# Patient Record
Sex: MALE | Race: WHITE | NOT HISPANIC OR LATINO | Employment: FULL TIME | URBAN - NONMETROPOLITAN AREA
[De-identification: names, ages, dates, MRNs, and addresses within clinical notes are randomized per-mention and may not be internally consistent; named-entity substitution may affect disease eponyms.]

---

## 2019-03-01 ENCOUNTER — TRANSCRIBE ORDERS (OUTPATIENT)
Dept: ADMINISTRATIVE | Facility: HOSPITAL | Age: 29
End: 2019-03-01

## 2019-03-01 DIAGNOSIS — R94.31 ABNORMAL EKG: Primary | ICD-10-CM

## 2019-03-20 ENCOUNTER — HOSPITAL ENCOUNTER (OUTPATIENT)
Dept: CARDIOLOGY | Facility: HOSPITAL | Age: 29
Discharge: HOME | End: 2019-03-20
Admitting: FAMILY MEDICINE

## 2019-03-20 DIAGNOSIS — R94.31 ABNORMAL EKG: ICD-10-CM

## 2019-03-20 PROCEDURE — 93306 TTE W/DOPPLER COMPLETE: CPT

## 2019-03-24 LAB
MAXIMAL PREDICTED HEART RATE: 193 BPM
STRESS TARGET HR: 164 BPM

## 2022-06-26 ENCOUNTER — APPOINTMENT (OUTPATIENT)
Dept: GENERAL RADIOLOGY | Facility: HOSPITAL | Age: 32
End: 2022-06-26

## 2022-06-26 ENCOUNTER — HOSPITAL ENCOUNTER (EMERGENCY)
Facility: HOSPITAL | Age: 32
Discharge: HOME | End: 2022-06-26
Attending: EMERGENCY MEDICINE | Admitting: EMERGENCY MEDICINE

## 2022-06-26 VITALS
OXYGEN SATURATION: 97 % | DIASTOLIC BLOOD PRESSURE: 97 MMHG | RESPIRATION RATE: 20 BRPM | HEIGHT: 73 IN | SYSTOLIC BLOOD PRESSURE: 165 MMHG | WEIGHT: 256 LBS | TEMPERATURE: 99.4 F | HEART RATE: 97 BPM | BODY MASS INDEX: 33.93 KG/M2

## 2022-06-26 DIAGNOSIS — R50.9 FEVER AND CHILLS: Primary | ICD-10-CM

## 2022-06-26 DIAGNOSIS — B34.9 VIRAL ILLNESS: ICD-10-CM

## 2022-06-26 LAB
FLUAV RNA RESP QL NAA+PROBE: NOT DETECTED
FLUBV RNA RESP QL NAA+PROBE: NOT DETECTED
S PYO AG THROAT QL: NEGATIVE
SARS-COV-2 RNA RESP QL NAA+PROBE: NOT DETECTED

## 2022-06-26 PROCEDURE — 87880 STREP A ASSAY W/OPTIC: CPT | Performed by: PHYSICIAN ASSISTANT

## 2022-06-26 PROCEDURE — 87636 SARSCOV2 & INF A&B AMP PRB: CPT | Performed by: PHYSICIAN ASSISTANT

## 2022-06-26 PROCEDURE — 87081 CULTURE SCREEN ONLY: CPT | Performed by: PHYSICIAN ASSISTANT

## 2022-06-26 PROCEDURE — C9803 HOPD COVID-19 SPEC COLLECT: HCPCS | Performed by: PHYSICIAN ASSISTANT

## 2022-06-26 PROCEDURE — 71046 X-RAY EXAM CHEST 2 VIEWS: CPT

## 2022-06-26 PROCEDURE — 99283 EMERGENCY DEPT VISIT LOW MDM: CPT

## 2022-06-26 RX ORDER — IBUPROFEN 800 MG/1
800 TABLET ORAL EVERY 8 HOURS PRN
Qty: 60 TABLET | Refills: 0 | Status: SHIPPED | OUTPATIENT
Start: 2022-06-26

## 2022-06-26 RX ORDER — IBUPROFEN 800 MG/1
800 TABLET ORAL ONCE
Status: COMPLETED | OUTPATIENT
Start: 2022-06-26 | End: 2022-06-26

## 2022-06-26 RX ORDER — ACETAMINOPHEN 500 MG
500 TABLET ORAL EVERY 6 HOURS PRN
Qty: 60 TABLET | Refills: 0 | Status: SHIPPED | OUTPATIENT
Start: 2022-06-26

## 2022-06-26 RX ADMIN — IBUPROFEN 800 MG: 800 TABLET ORAL at 11:12

## 2022-06-26 NOTE — ED PROVIDER NOTES
Subjective   31-year-old male presents to the emergency department chief complaint fever, cough, chills, body aches for the past 2 days.  Patient was seen at urgent care yesterday.  Patient states he has continued to spike fevers of 10 2-1 03.  Has had nonproductive cough.  Patient also states he has had sore throat.      History provided by:  Patient   used: No    Flu Symptoms  Presenting symptoms: cough, fatigue, fever, headache, myalgias and sore throat    Presenting symptoms: no diarrhea and no nausea    Severity:  Moderate  Onset quality:  Gradual  Duration:  2 days  Progression:  Worsening  Chronicity:  New  Relieved by:  Nothing  Worsened by:  Nothing  Associated symptoms: chills and nasal congestion    Associated symptoms: no neck stiffness    Risk factors: not elderly, no diabetes problem, no heart disease, no immunocompromised state, no kidney disease, no liver disease, not pregnant and no sick contacts        Review of Systems   Constitutional: Positive for chills, fatigue and fever.   HENT: Positive for congestion and sore throat.    Eyes: Negative.  Negative for photophobia, pain, redness and itching.   Respiratory: Positive for cough.    Cardiovascular: Negative for leg swelling.   Gastrointestinal: Negative.  Negative for anal bleeding, blood in stool, constipation, diarrhea, nausea and rectal pain.   Genitourinary: Negative.  Negative for genital sores.   Musculoskeletal: Positive for myalgias. Negative for arthralgias, back pain, gait problem and neck stiffness.   Neurological: Positive for headaches.   All other systems reviewed and are negative.      History reviewed. No pertinent past medical history.    No Known Allergies    History reviewed. No pertinent surgical history.    History reviewed. No pertinent family history.    Social History     Socioeconomic History   • Marital status:    Tobacco Use   • Smoking status: Former Smoker   • Smokeless tobacco: Never Used    Substance and Sexual Activity   • Alcohol use: Yes   • Sexual activity: Defer           Objective   Physical Exam  Vitals and nursing note reviewed.   Constitutional:       General: He is not in acute distress.     Appearance: Normal appearance. He is well-developed and normal weight. He is not ill-appearing, toxic-appearing or diaphoretic.   HENT:      Head: Normocephalic and atraumatic.      Right Ear: Tympanic membrane, ear canal and external ear normal. There is no impacted cerumen.      Left Ear: Tympanic membrane, ear canal and external ear normal. There is no impacted cerumen.      Nose: Nose normal. No congestion or rhinorrhea.      Mouth/Throat:      Mouth: Mucous membranes are moist.      Pharynx: Oropharynx is clear. No oropharyngeal exudate or posterior oropharyngeal erythema.   Eyes:      General: No scleral icterus.        Right eye: No discharge.         Left eye: No discharge.      Extraocular Movements: Extraocular movements intact.      Conjunctiva/sclera: Conjunctivae normal.      Pupils: Pupils are equal, round, and reactive to light.   Neck:      Vascular: No carotid bruit.   Cardiovascular:      Rate and Rhythm: Normal rate and regular rhythm.      Pulses: Normal pulses.      Heart sounds: Normal heart sounds. No murmur heard.    No friction rub. No gallop.   Pulmonary:      Effort: Pulmonary effort is normal. No respiratory distress.      Breath sounds: Normal breath sounds. No stridor. No wheezing, rhonchi or rales.   Chest:      Chest wall: No tenderness.   Abdominal:      General: Bowel sounds are normal. There is no distension.      Palpations: Abdomen is soft. There is no mass.      Tenderness: There is no abdominal tenderness. There is no right CVA tenderness, left CVA tenderness, guarding or rebound.      Hernia: No hernia is present.   Musculoskeletal:         General: No swelling, tenderness, deformity or signs of injury. Normal range of motion.      Cervical back: Normal range of  motion and neck supple. No rigidity or tenderness.      Right lower leg: No edema.      Left lower leg: No edema.   Lymphadenopathy:      Cervical: No cervical adenopathy.   Skin:     General: Skin is warm and dry.      Capillary Refill: Capillary refill takes less than 2 seconds.      Coloration: Skin is not jaundiced or pale.      Findings: No bruising, erythema, lesion or rash.   Neurological:      General: No focal deficit present.      Mental Status: He is alert and oriented to person, place, and time.      Cranial Nerves: No cranial nerve deficit.      Sensory: No sensory deficit.      Motor: No weakness.      Coordination: Coordination normal.      Gait: Gait normal.      Deep Tendon Reflexes: Reflexes are normal and symmetric. Reflexes normal.   Psychiatric:         Mood and Affect: Mood normal.         Behavior: Behavior normal.         Thought Content: Thought content normal.         Judgment: Judgment normal.         Procedures           ED Course  ED Course as of 06/26/22 1228   Sun Jun 26, 2022   1226   IMPRESSION:  No acute cardiopulmonary process. [BH]      ED Course User Index  [] Perez Dangelo PA-C                                           [facility]    Final diagnoses:   Fever and chills   Viral illness       ED Disposition  ED Disposition     ED Disposition   Discharge    Condition   Stable    Comment   --             Frantz Santo,   [address] [address]  [phone]    Call in 1 day           Medication List      New Prescriptions    acetaminophen 500 MG tablet  Commonly known as: TYLENOL  Take 1 tablet by mouth Every 6 (Six) Hours As Needed for Mild Pain .     ibuprofen 800 MG tablet  Commonly known as: ADVIL,MOTRIN  Take 1 tablet by mouth Every 8 (Eight) Hours As Needed for Mild Pain .           Where to Get Your Medications      These medications were sent to [facility] [address] [place] [address] HUYEN MONACO [facility] - [phone]  -  [phone] FX  [address] [facility] [address]    Phone: [phone]   · acetaminophen 500 MG tablet  · ibuprofen 800 MG tablet          Perez Dangelo PA-C  06/26/22 1854

## 2022-06-27 ENCOUNTER — HOSPITAL ENCOUNTER (EMERGENCY)
Facility: HOSPITAL | Age: 32
Discharge: HOME | End: 2022-06-27
Attending: EMERGENCY MEDICINE | Admitting: EMERGENCY MEDICINE

## 2022-06-27 VITALS
BODY MASS INDEX: 34.54 KG/M2 | DIASTOLIC BLOOD PRESSURE: 98 MMHG | HEIGHT: 72 IN | HEART RATE: 70 BPM | SYSTOLIC BLOOD PRESSURE: 152 MMHG | WEIGHT: 255 LBS | TEMPERATURE: 97.7 F | RESPIRATION RATE: 18 BRPM | OXYGEN SATURATION: 100 %

## 2022-06-27 DIAGNOSIS — B34.9 VIRAL ILLNESS: Primary | ICD-10-CM

## 2022-06-27 DIAGNOSIS — E86.0 DEHYDRATION: ICD-10-CM

## 2022-06-27 LAB
ALBUMIN SERPL-MCNC: 4.3 G/DL (ref 3.5–5.2)
ALBUMIN/GLOB SERPL: 1.6 G/DL
ALP SERPL-CCNC: 80 U/L (ref 39–117)
ALT SERPL W P-5'-P-CCNC: 30 U/L (ref 1–41)
ANION GAP SERPL CALCULATED.3IONS-SCNC: 16.1 MMOL/L (ref 5–15)
AST SERPL-CCNC: 30 U/L (ref 1–40)
BASOPHILS # BLD AUTO: 0.02 10*3/MM3 (ref 0–0.2)
BASOPHILS NFR BLD AUTO: 0.3 % (ref 0–1.5)
BILIRUB SERPL-MCNC: 0.4 MG/DL (ref 0–1.2)
BUN SERPL-MCNC: 16 MG/DL (ref 6–20)
BUN/CREAT SERPL: 14.7 (ref 7–25)
CALCIUM SPEC-SCNC: 8.9 MG/DL (ref 8.6–10.5)
CHLORIDE SERPL-SCNC: 103 MMOL/L (ref 98–107)
CK SERPL-CCNC: 207 U/L (ref 20–200)
CO2 SERPL-SCNC: 18.9 MMOL/L (ref 22–29)
CREAT SERPL-MCNC: 1.09 MG/DL (ref 0.76–1.27)
DEPRECATED RDW RBC AUTO: 43.5 FL (ref 37–54)
EGFRCR SERPLBLD CKD-EPI 2021: 93.6 ML/MIN/1.73
EOSINOPHIL # BLD AUTO: 0.13 10*3/MM3 (ref 0–0.4)
EOSINOPHIL NFR BLD AUTO: 1.7 % (ref 0.3–6.2)
ERYTHROCYTE [DISTWIDTH] IN BLOOD BY AUTOMATED COUNT: 13.6 % (ref 12.3–15.4)
GLOBULIN UR ELPH-MCNC: 2.7 GM/DL
GLUCOSE SERPL-MCNC: 87 MG/DL (ref 65–99)
HCT VFR BLD AUTO: 41.8 % (ref 37.5–51)
HGB BLD-MCNC: 14.4 G/DL (ref 13–17.7)
IMM GRANULOCYTES # BLD AUTO: 0.02 10*3/MM3 (ref 0–0.05)
IMM GRANULOCYTES NFR BLD AUTO: 0.3 % (ref 0–0.5)
LIPASE SERPL-CCNC: 21 U/L (ref 13–60)
LYMPHOCYTES # BLD AUTO: 1.71 10*3/MM3 (ref 0.7–3.1)
LYMPHOCYTES NFR BLD AUTO: 22.3 % (ref 19.6–45.3)
MAGNESIUM SERPL-MCNC: 1.9 MG/DL (ref 1.6–2.6)
MCH RBC QN AUTO: 30.1 PG (ref 26.6–33)
MCHC RBC AUTO-ENTMCNC: 34.4 G/DL (ref 31.5–35.7)
MCV RBC AUTO: 87.3 FL (ref 79–97)
MONOCYTES # BLD AUTO: 0.55 10*3/MM3 (ref 0.1–0.9)
MONOCYTES NFR BLD AUTO: 7.2 % (ref 5–12)
MYOGLOBIN SERPL-MCNC: 59 NG/ML (ref 28–72)
NEUTROPHILS NFR BLD AUTO: 5.24 10*3/MM3 (ref 1.7–7)
NEUTROPHILS NFR BLD AUTO: 68.2 % (ref 42.7–76)
NRBC BLD AUTO-RTO: 0 /100 WBC (ref 0–0.2)
PLATELET # BLD AUTO: 262 10*3/MM3 (ref 140–450)
PMV BLD AUTO: 11.1 FL (ref 6–12)
POTASSIUM SERPL-SCNC: 4.6 MMOL/L (ref 3.5–5.2)
PROT SERPL-MCNC: 7 G/DL (ref 6–8.5)
RBC # BLD AUTO: 4.79 10*6/MM3 (ref 4.14–5.8)
SODIUM SERPL-SCNC: 138 MMOL/L (ref 136–145)
WBC NRBC COR # BLD: 7.67 10*3/MM3 (ref 3.4–10.8)

## 2022-06-27 PROCEDURE — 82550 ASSAY OF CK (CPK): CPT | Performed by: PHYSICIAN ASSISTANT

## 2022-06-27 PROCEDURE — 96360 HYDRATION IV INFUSION INIT: CPT

## 2022-06-27 PROCEDURE — 83690 ASSAY OF LIPASE: CPT | Performed by: PHYSICIAN ASSISTANT

## 2022-06-27 PROCEDURE — 85025 COMPLETE CBC W/AUTO DIFF WBC: CPT | Performed by: PHYSICIAN ASSISTANT

## 2022-06-27 PROCEDURE — 99283 EMERGENCY DEPT VISIT LOW MDM: CPT

## 2022-06-27 PROCEDURE — 80053 COMPREHEN METABOLIC PANEL: CPT | Performed by: PHYSICIAN ASSISTANT

## 2022-06-27 PROCEDURE — 83735 ASSAY OF MAGNESIUM: CPT | Performed by: PHYSICIAN ASSISTANT

## 2022-06-27 PROCEDURE — 83874 ASSAY OF MYOGLOBIN: CPT | Performed by: PHYSICIAN ASSISTANT

## 2022-06-27 RX ORDER — SODIUM CHLORIDE 0.9 % (FLUSH) 0.9 %
10 SYRINGE (ML) INJECTION AS NEEDED
Status: DISCONTINUED | OUTPATIENT
Start: 2022-06-27 | End: 2022-06-27 | Stop reason: HOSPADM

## 2022-06-27 RX ADMIN — SODIUM CHLORIDE 1000 ML: 9 INJECTION, SOLUTION INTRAVENOUS at 12:15

## 2022-06-27 RX ADMIN — SODIUM CHLORIDE 1000 ML: 9 INJECTION, SOLUTION INTRAVENOUS at 13:55

## 2022-06-28 LAB — BACTERIA SPEC AEROBE CULT: NORMAL

## 2023-02-02 ENCOUNTER — HOSPITAL ENCOUNTER (EMERGENCY)
Facility: HOSPITAL | Age: 33
Discharge: HOME | End: 2023-02-02
Attending: EMERGENCY MEDICINE
Payer: COMMERCIAL

## 2023-02-02 VITALS
DIASTOLIC BLOOD PRESSURE: 95 MMHG | HEIGHT: 72 IN | TEMPERATURE: 97.8 F | HEART RATE: 76 BPM | OXYGEN SATURATION: 98 % | SYSTOLIC BLOOD PRESSURE: 127 MMHG | WEIGHT: 255 LBS | BODY MASS INDEX: 34.54 KG/M2 | RESPIRATION RATE: 18 BRPM

## 2023-02-02 DIAGNOSIS — L50.9 URTICARIA: ICD-10-CM

## 2023-02-02 DIAGNOSIS — R22.0 SWELLING OF BOTH LIPS: ICD-10-CM

## 2023-02-02 DIAGNOSIS — T78.40XA ALLERGIC REACTION, INITIAL ENCOUNTER: Primary | ICD-10-CM

## 2023-02-02 PROCEDURE — 99283 EMERGENCY DEPT VISIT LOW MDM: CPT

## 2023-02-02 PROCEDURE — 6370000000 HC RX 637 (ALT 250 FOR IP): Performed by: EMERGENCY MEDICINE

## 2023-02-02 PROCEDURE — 94760 N-INVAS EAR/PLS OXIMETRY 1: CPT

## 2023-02-02 RX ORDER — FAMOTIDINE 40 MG/1
40 TABLET, FILM COATED ORAL EVERY EVENING
Qty: 7 TABLET | Refills: 0 | Status: SHIPPED | OUTPATIENT
Start: 2023-02-02 | End: 2023-02-09

## 2023-02-02 RX ORDER — BUPROPION HYDROCHLORIDE 150 MG/1
150 TABLET, EXTENDED RELEASE ORAL 2 TIMES DAILY
COMMUNITY

## 2023-02-02 RX ORDER — DIPHENHYDRAMINE HCL 25 MG
50 CAPSULE ORAL ONCE
Status: COMPLETED | OUTPATIENT
Start: 2023-02-02 | End: 2023-02-02

## 2023-02-02 RX ORDER — PREDNISONE 20 MG/1
60 TABLET ORAL DAILY
Qty: 15 TABLET | Refills: 0 | Status: SHIPPED | OUTPATIENT
Start: 2023-02-02 | End: 2023-02-07

## 2023-02-02 RX ORDER — FAMOTIDINE 20 MG/1
40 TABLET, FILM COATED ORAL ONCE
Status: COMPLETED | OUTPATIENT
Start: 2023-02-02 | End: 2023-02-02

## 2023-02-02 RX ORDER — HYDROXYZINE PAMOATE 25 MG/1
50 CAPSULE ORAL 3 TIMES DAILY PRN
Qty: 21 CAPSULE | Refills: 0 | Status: SHIPPED | OUTPATIENT
Start: 2023-02-02 | End: 2023-02-09

## 2023-02-02 RX ADMIN — DIPHENHYDRAMINE HYDROCHLORIDE 50 MG: 25 CAPSULE ORAL at 03:08

## 2023-02-02 RX ADMIN — FAMOTIDINE 40 MG: 20 TABLET ORAL at 03:08

## 2023-02-02 RX ADMIN — PREDNISONE 60 MG: 50 TABLET ORAL at 03:08

## 2023-02-02 ASSESSMENT — PAIN - FUNCTIONAL ASSESSMENT
PAIN_FUNCTIONAL_ASSESSMENT: NONE - DENIES PAIN
PAIN_FUNCTIONAL_ASSESSMENT: NONE - DENIES PAIN

## 2023-02-02 ASSESSMENT — LIFESTYLE VARIABLES
HOW OFTEN DO YOU HAVE A DRINK CONTAINING ALCOHOL: NEVER
HOW MANY STANDARD DRINKS CONTAINING ALCOHOL DO YOU HAVE ON A TYPICAL DAY: PATIENT DOES NOT DRINK

## 2023-02-02 NOTE — Clinical Note
Tiffanie Escobar was seen and treated in our emergency department on 2/2/2023. He may return to work on 02/03/2023.  ? If you have any questions or concerns, please don't hesitate to call.       Yun Kuo MD

## 2023-02-02 NOTE — ED PROVIDER NOTES
[address] ENCOUNTER      Pt Name: Thomas Smiley  MRN: [id number]  YOB: 1990  Date of evaluation: 2/2/2023  Provider: Maddi Bonilla MD    CHIEF COMPLAINT       Chief Complaint   Patient presents with    Allergic Reaction     Patient states started having itching and hives yesterday, came home from work, took childrens benadryl. Symptoms got better but are now worse. HISTORY OF PRESENT ILLNESS  (Location/Symptom, Timing/Onset, Context/Setting, Quality, Duration, Modifying Factors, Severity.)   Thomas Smiley is a 33 y.o. male who presents to the emergency department with what patient describes as allergic reaction that he thinks he came in contact with a mold that caused him to have hives and swelling of his lips and a scratchy throat. Patient denies any chest pain shortness of breath or any focal signs or symptoms. Patient states it all started yesterday but that his symptoms started to get progressively worse so he presented emergency department for further evaluation. Patient states that he took children's Benadryl with mild improvement. Patient states that he is unable to sleep secondary to the itching. Patient is resting comfortably in the room in no acute distress conversing in full sentences nontoxic      Nursing notes were reviewed. REVIEW OFSYSTEMS    (2-9 systems for level 4, 10 or more for level 5)   ROS:  General:  No fevers, no chills, no weakness.   Lip swelling  Cardiovascular:  No chest pain, no palpitations  Respiratory:  No shortness of breath, no cough, no wheezing  Gastrointestinal:  No pain, no nausea, no vomiting, no diarrhea  Musculoskeletal:  No muscle pain, no joint pain  Skin: Hives of trunk and extremities  Neurologic:  No speech problems, no headache, no extremity weakness  Psychiatric:  No anxiety  Genitourinary:  No dysuria, no hematuria    Except as noted above the remainder of the review of systems was reviewed and negative. PAST MEDICAL HISTORY   History reviewed. No pertinent past medical history. SURGICAL HISTORY     History reviewed. No pertinent surgical history. CURRENT MEDICATIONS       Discharge Medication List as of 2/2/2023  3:09 AM        CONTINUE these medications which have NOT CHANGED    Details   buPROPion (WELLBUTRIN SR) 150 MG extended release tablet Take 150 mg by mouth 2 times dailyHistorical Med      naproxen (NAPROSYN) 500 MG tablet Take 1 tablet by mouth 2 times daily, Disp-30 tablet, R-0             ALLERGIES     Patient has no known allergies. FAMILY HISTORY     History reviewed. No pertinent family history. SOCIAL HISTORY       Social History     Socioeconomic History    Marital status: Single     Spouse name: None    Number of children: None    Years of education: None    Highest education level: None   Tobacco Use    Smoking status: Every Day     Packs/day: 0.50     Types: Cigarettes   Vaping Use    Vaping Use: Never used   Substance and Sexual Activity    Alcohol use: Not Currently    Drug use: Never    Sexual activity: Yes     Partners: Female         PHYSICAL EXAM    (up to 7 for level 4, 8 or more for level 5)     ED Triage Vitals [02/02/23 0250]   BP Temp Temp Source Heart Rate Resp SpO2 Height Weight   (!) 127/95 97.8 °F (36.6 °C) Oral 76 18 98 % 6' (1.829 m) 255 lb (115.7 kg)       Physical Exam  General :Patient is awake, alert, oriented, in no acute distress, nontoxic appearing  HEENT: Pupils are equally round and reactive to light, EOMI, conjunctivae clear. Oral mucosa is moist, no exudate. Uvula is midline. Mild lip swelling upper and lower. No involvement of oral pharynx which is patent  Neck: Neck is supple, full range of motion, trachea midline  Cardiac: Heart regular rate, rhythm, no murmurs, rubs, or gallops  Lungs: Lungs are clear to auscultation, there is no wheezing, rhonchi, or rales. There is no use of accessory muscles. Chest wall:  There is no tenderness to palpation over the chest wall or over ribs  Abdomen: Abdomen is soft, nontender, nondistended. There is no firm or pulsatile masses, no rebound rigidity or guarding. Musculoskeletal: 5 out of 5 strength in all 4 extremities. No focal muscle deficits are appreciated  Neuro: Motor intact, sensory intact, level of consciousness is normal, cerebellar function is normal, reflexes are grossly normal. No evidence of incontinence or loss of bowel or bladder function, no saddle anesthesia noted   Dermatology: Urticaria of trunk and extremities that suman on palpation  Psych: Mentation is grossly normal, cognition is grossly normal. Affect is appropriate. DIAGNOSTIC RESULTS     EKG: All EKG's are interpreted by the Emergency Department Physician who either signs or Co-signs this chart in the [address] a cardiologist.    The EKG interpreted by me shows     RADIOLOGY:   Non-plain film images such as CT, Ultrasound and MRI are read by the radiologist. Plain radiographic images are visualized and preliminarily interpreted by the emergency physician with the below findings:      [] Radiologist's Report Reviewed:  No orders to display         ED BEDSIDE ULTRASOUND:   Performed by ED Physician - none    LABS:    I have reviewed and interpreted all of the currently available lab results from this visit (ifapplicable):  No results found for this visit on 02/02/23. All other labs were within normal range or not returned as of this dictation.     EMERGENCY DEPARTMENT COURSE and DIFFERENTIAL DIAGNOSIS/MDM:   Vitals:    Vitals:    02/02/23 0250   BP: (!) 127/95   Pulse: 76   Resp: 18   Temp: 97.8 °F (36.6 °C)   TempSrc: Oral   SpO2: 98%   Weight: 255 lb (115.7 kg)   Height: 6' (1.829 m)       MEDICATIONS ADMINISTERED IN ED:  Medications   predniSONE (DELTASONE) tablet 60 mg (60 mg Oral Given 2/2/23 0308)   diphenhydrAMINE (BENADRYL) capsule 50 mg (50 mg Oral Given 2/2/23 0308)   famotidine (PEPCID) tablet 40 mg (40 mg Oral Given 2/2/23 0308)       Patient was placed in examination room at which time after exam was performed patient was given prednisone 60 mg p.o. along with 50 mg of Benadryl p.o. and 40 mg of Pepcid p.o. Action plan was put in place with patient for him to abstain from moldy areas and to take medications as prescribed and follow-up primary physician 1 to 2 days for allergy skin testing. Patient requested a work note. Patient was appreciative the care and agrees with plan above. The patient will follow-up with their PCP in 1-2 days for reevaluation. If the patient or family members have anyfurther concerns or any worsening symptoms they will return to the ED for reevaluation. Is this patient to be included in the SEP-1 Core Measure due to severe sepsis or septic shock? No   Exclusion criteria - the patient is NOT to be included for SEP-1 Core Measure due to:  2+ SIRS criteria are not met          CONSULTS:  None    PROCEDURES:  Procedures    CRITICAL CARE TIME    Total Critical Care time was 0 minutes, excluding separately reportable procedures. There was a high probability of clinically significant/life threatening deterioration in the patient's condition which required my urgent intervention. FINAL IMPRESSION      1. Allergic reaction, initial encounter Stable   2. Urticaria Stable   3. Swelling of both lips Stable         DISPOSITION/PLAN   DISPOSITION Decision To Discharge 02/02/2023 03:05:16 AM      PATIENT REFERRED TO:  JOEY Vazquez NP  [phone] formerly [facility] [address] [address]  [phone]    Schedule an appointment as soon as possible for a visit in 2 days      [facility].   [facility]  [phone]  In 2 days  If symptoms worsen      DISCHARGE MEDICATIONS:  Discharge Medication List as of 2/2/2023  3:09 AM        START taking these medications    Details   predniSONE (DELTASONE) 20 MG tablet Take 3 tablets by mouth daily for 5 days, Disp-15 tablet, R-0Normal      hydrOXYzine pamoate (VISTARIL) 25 MG capsule Take 2 capsules by mouth 3 times daily as needed for Itching, Disp-21 capsule, R-0Normal      famotidine (PEPCID) 40 MG tablet Take 1 tablet by mouth every evening for 7 days, Disp-7 tablet, R-0Normal             Comment: Please note this report has been produced using speech recognition software and may contain errorsrelated to that system including errors in grammar, punctuation, and spelling, as well as words and phrases that may be inappropriate. If there are any questions or concerns please feel free to contact the dictating providerfor clarification.     Allison Tolentino MD  Attending Emergency Physician               Allison Tolentino MD  02/02/23 5461